# Patient Record
Sex: MALE
[De-identification: names, ages, dates, MRNs, and addresses within clinical notes are randomized per-mention and may not be internally consistent; named-entity substitution may affect disease eponyms.]

---

## 2020-05-10 ENCOUNTER — HOSPITAL ENCOUNTER (OUTPATIENT)
Dept: HOSPITAL 92 - ERS | Age: 65
Setting detail: OBSERVATION
LOS: 2 days | Discharge: HOME | End: 2020-05-12
Attending: INTERNAL MEDICINE | Admitting: INTERNAL MEDICINE
Payer: SELF-PAY

## 2020-05-10 VITALS — BODY MASS INDEX: 27.8 KG/M2

## 2020-05-10 DIAGNOSIS — R42: ICD-10-CM

## 2020-05-10 DIAGNOSIS — R00.1: ICD-10-CM

## 2020-05-10 DIAGNOSIS — R55: Primary | ICD-10-CM

## 2020-05-10 DIAGNOSIS — R61: ICD-10-CM

## 2020-05-10 DIAGNOSIS — R11.2: ICD-10-CM

## 2020-05-10 LAB
ALBUMIN SERPL BCG-MCNC: 4.3 G/DL (ref 3.4–4.8)
ALP SERPL-CCNC: 113 U/L (ref 40–110)
ALT SERPL W P-5'-P-CCNC: 53 U/L (ref 8–55)
ANION GAP SERPL CALC-SCNC: 12 MMOL/L (ref 10–20)
AST SERPL-CCNC: 37 U/L (ref 5–34)
BASOPHILS # BLD AUTO: 0.1 THOU/UL (ref 0–0.2)
BASOPHILS NFR BLD AUTO: 0.5 % (ref 0–1)
BILIRUB SERPL-MCNC: 0.3 MG/DL (ref 0.2–1.2)
BUN SERPL-MCNC: 17 MG/DL (ref 8.4–25.7)
CALCIUM SERPL-MCNC: 9.5 MG/DL (ref 7.8–10.44)
CHLORIDE SERPL-SCNC: 104 MMOL/L (ref 98–107)
CK SERPL-CCNC: 98 U/L (ref 30–200)
CO2 SERPL-SCNC: 29 MMOL/L (ref 23–31)
CREAT CL PREDICTED SERPL C-G-VRATE: 0 ML/MIN (ref 70–130)
EOSINOPHIL # BLD AUTO: 0.1 THOU/UL (ref 0–0.7)
EOSINOPHIL NFR BLD AUTO: 0.8 % (ref 0–10)
GLOBULIN SER CALC-MCNC: 2.5 G/DL (ref 2.4–3.5)
GLUCOSE SERPL-MCNC: 99 MG/DL (ref 80–115)
HGB BLD-MCNC: 14.5 G/DL (ref 14–18)
LIPASE SERPL-CCNC: 26 U/L (ref 8–78)
LYMPHOCYTES # BLD: 1.1 THOU/UL (ref 1.2–3.4)
LYMPHOCYTES NFR BLD AUTO: 9.3 % (ref 21–51)
MCH RBC QN AUTO: 32.5 PG (ref 27–31)
MCV RBC AUTO: 97.2 FL (ref 78–98)
MONOCYTES # BLD AUTO: 0.6 THOU/UL (ref 0.11–0.59)
MONOCYTES NFR BLD AUTO: 5 % (ref 0–10)
NEUTROPHILS # BLD AUTO: 9.6 THOU/UL (ref 1.4–6.5)
NEUTROPHILS NFR BLD AUTO: 84.4 % (ref 42–75)
PLATELET # BLD AUTO: 213 THOU/UL (ref 130–400)
POTASSIUM SERPL-SCNC: 4.5 MMOL/L (ref 3.5–5.1)
RBC # BLD AUTO: 4.47 MILL/UL (ref 4.7–6.1)
SODIUM SERPL-SCNC: 140 MMOL/L (ref 136–145)
TROPONIN I SERPL DL<=0.01 NG/ML-MCNC: (no result) NG/ML (ref ?–0.03)
WBC # BLD AUTO: 11.3 THOU/UL (ref 4.8–10.8)

## 2020-05-10 PROCEDURE — 96361 HYDRATE IV INFUSION ADD-ON: CPT

## 2020-05-10 PROCEDURE — 96375 TX/PRO/DX INJ NEW DRUG ADDON: CPT

## 2020-05-10 PROCEDURE — 96376 TX/PRO/DX INJ SAME DRUG ADON: CPT

## 2020-05-10 PROCEDURE — 96372 THER/PROPH/DIAG INJ SC/IM: CPT

## 2020-05-10 PROCEDURE — 70450 CT HEAD/BRAIN W/O DYE: CPT

## 2020-05-10 PROCEDURE — 84443 ASSAY THYROID STIM HORMONE: CPT

## 2020-05-10 PROCEDURE — G0378 HOSPITAL OBSERVATION PER HR: HCPCS

## 2020-05-10 PROCEDURE — 93306 TTE W/DOPPLER COMPLETE: CPT

## 2020-05-10 PROCEDURE — 93005 ELECTROCARDIOGRAM TRACING: CPT

## 2020-05-10 PROCEDURE — 85025 COMPLETE CBC W/AUTO DIFF WBC: CPT

## 2020-05-10 PROCEDURE — 84484 ASSAY OF TROPONIN QUANT: CPT

## 2020-05-10 PROCEDURE — 83690 ASSAY OF LIPASE: CPT

## 2020-05-10 PROCEDURE — 96374 THER/PROPH/DIAG INJ IV PUSH: CPT

## 2020-05-10 PROCEDURE — 71045 X-RAY EXAM CHEST 1 VIEW: CPT

## 2020-05-10 PROCEDURE — 80053 COMPREHEN METABOLIC PANEL: CPT

## 2020-05-10 PROCEDURE — 82550 ASSAY OF CK (CPK): CPT

## 2020-05-10 PROCEDURE — 81003 URINALYSIS AUTO W/O SCOPE: CPT

## 2020-05-10 PROCEDURE — 36415 COLL VENOUS BLD VENIPUNCTURE: CPT

## 2020-05-10 PROCEDURE — 80061 LIPID PANEL: CPT

## 2020-05-10 RX ADMIN — AZITHROMYCIN SCH MLS: 500 INJECTION, POWDER, LYOPHILIZED, FOR SOLUTION INTRAVENOUS at 23:47

## 2020-05-10 RX ADMIN — CEFTRIAXONE SCH MLS: 2 INJECTION, POWDER, FOR SOLUTION INTRAMUSCULAR; INTRAVENOUS at 22:44

## 2020-05-10 NOTE — CT
CT head noncontrast



HISTORY: Vomiting. Hypertension.



FINDINGS: There is no evidence of acute intracranial hemorrhage or infarct. The ventricles appear nor
mal in size, shape and position.



There is no mass effect or shift of midline structures.



Visualized paranasal sinuses remain well aerated. Small osseous excrescence is noted just anterior to
 the left side of the base of the nasal bone.



  



IMPRESSION :

No acute intracranial abnormalities are demonstrated.



Reported By: FLASH Freire 

Electronically Signed:  5/10/2020 7:12 PM

## 2020-05-10 NOTE — RAD
Chest one view



HISTORY: Syncope.



FINDINGS: Cardiac silhouette is magnified by projection. Pulmonary vasculature is unremarkable.



Subtle ill-defined areas of increased density at each lower lobe may represent mild parenchymal infil
trate.



No lobar consolidation or evidence of pneumothorax.



Gas under the right hemidiaphragm appears to represent the hepatic flexure of the colon.



  



IMPRESSION :

Possible mild patchy bibasilar infiltrate. Clinical correlation regarding other signs and symptoms of
 multifocal pneumonitis is required. No lobar consolidation.



Reported By: FLASH Freire 

Electronically Signed:  5/10/2020 6:57 PM

## 2020-05-10 NOTE — HP
PRIMARY CARE PHYSICIAN:  None.



CHIEF COMPLAINT:  Near syncope, nausea, vomiting, and diaphoresis.



HISTORY OF PRESENT ILLNESS:  This is a very pleasant 64-year-old  male, who

reported to the emergency room today after he was at his son's house, had lunch,

drank a soda, which he had stopped drinking about seven months ago and had

near-syncope episodes, some diaphoresis, nausea, and then threw up once.  He reports

that this happens when he drinks soda.  The last time that he was admitted was in

[facility] where they did a workup.  He says they did an

echocardiogram at that time and he was sent home with no real explanation other than

he needed to stop drinking soda.  He reports that this episode happened again two

weeks ago and again he had a soda at that time.  He was always concerned because

while he was in the emergency room, his heart rate dropped into the 40s and he said

at that point that he also felt the similar symptoms with the nausea and diaphoresis

and the weakness.  His workup in the emergency room with a brain CT that was

unremarkable; chest x-ray, which shows possible mild patchy bibasilar infiltrates.

There is no lobar consolidation.  He denies any fever or chills at home.  Blood work

with a slightly elevated white blood cell count of 11.3, hemoglobin 14.5, hematocrit

43.4, and platelet count 213.  Chemistry unremarkable except for an AST, which

slightly bumped to 37 and an alkaline phosphatase slightly bumped to 113.  Troponins

x2 were undetectable.  Urine is negative other than it was a little bit turbid with

no leukocyte esterase or white blood cells.  The patient to be admitted for possible

symptomatic bradycardia workup and further management. 



REVIEW OF SYSTEMS:  Reports near syncope.  Reports diaphoresis.  Reports nausea.

Reports vomiting.  Reports dizziness.  Reports weakness, which is transient.  Denies

fever, chills, cough, or shortness of breath.  All systems are reviewed and are

negative unless mentioned in the HPI or above. 



PAST MEDICAL HISTORY:  None other than the similar episodes over the last seven

months when he drinks soda. 



PAST SURGICAL HISTORY:  Right toe surgery.



PSYCHIATRIC HISTORY:  None.



SOCIAL HISTORY:  Drinks socially.  Denies drug use.  No smoking history.



KNOWN ALLERGIES:  None.



CURRENT MEDICATIONS:  None.



PHYSICAL EXAMINATION:

VITAL SIGNS:  Blood pressure 157/98, pulse 61, respiratory rate is 16, pO2 sats are

98% on room air. 

CONSTITUTIONAL:  The patient appears nontoxic.  He is alert and oriented to person,

place, and time. 

HEAD:  Atraumatic and normocephalic. 

EYES:  Pupils are equal, round, and reactive to light.  Extraocular muscles are

intact. 

ENT:  Mouth exam is normal.  Mucous membranes are moist. 

NECK:  Normal range of motion.  Trachea is midline. 

RESPIRATORY/CHEST:  Breath sounds are clear.  Chest expansion is equal. 

CARDIOVASCULAR:  Regular sinus rhythm.  Heart sounds are normal. 

ABDOMEN:  Nontender.  Bowel sounds are heard. 

BACK:  Normal range of motion.  No tenderness. 

EXTREMITIES:  Upper extremity, normal range of motion.  Motor strength is normal.

Radial pulses are normal.  Lower extremity, normal range of motion.  Motor strength

is normal.  Pedal pulses are normal.  No edema is noted. 

NEUROLOGIC:  The patient is oriented to person, place, and time.  There are no focal

deficits noted. 

SKIN:  Warm and dry.  Normal in color of the skin that is visualized.



DIAGNOSTIC DATA:  EKG in the emergency room shows sinus keyla, rate 57.  No ST or

T-wave changes. 



PLAN/ASSESSMENT:  

1. Possible symptomatic bradycardia.  Not sure about the correlations between

drinking a soda and these symptoms, although I did tell him that he probably should

stop drinking sodas.  We will get the records from [facility].  I have ordered an echocardiogram and a Cardiology consult.  We will repeat

labs, add lipids, TSH. 

2. He had some nausea and vomiting when he first appeared in the ER, but he states

that has resolved.  We will continue to monitor that. 

3. Deep venous thrombosis and gastrointestinal prophylaxis started.

4. Possible pneumonitis seen on the x-ray.  He denies any symptoms.  No cough.  No

shortness of breath.  No fever.  No chills.  The case is discussed with Dr. Lebron,

who suggested that we start antibiotics while he is hospitalized and while working

him up, probably could be deescalated if he continues to not have any symptoms. 

5. The case was discussed with Dr. Lebron and he agrees with the plan.

6. Hospitalization dependent on clinical findings.







Job ID:  [id number]

## 2020-05-11 LAB
ALBUMIN SERPL BCG-MCNC: 4 G/DL (ref 3.4–4.8)
ALP SERPL-CCNC: 109 U/L (ref 40–110)
ALT SERPL W P-5'-P-CCNC: 42 U/L (ref 8–55)
ANION GAP SERPL CALC-SCNC: 11 MMOL/L (ref 10–20)
AST SERPL-CCNC: 23 U/L (ref 5–34)
BASOPHILS # BLD AUTO: 0 THOU/UL (ref 0–0.2)
BASOPHILS NFR BLD AUTO: 0.4 % (ref 0–1)
BILIRUB SERPL-MCNC: 0.4 MG/DL (ref 0.2–1.2)
BUN SERPL-MCNC: 13 MG/DL (ref 8.4–25.7)
CALCIUM SERPL-MCNC: 9 MG/DL (ref 7.8–10.44)
CHD RISK SERPL-RTO: 4.3 (ref ?–4.5)
CHLORIDE SERPL-SCNC: 106 MMOL/L (ref 98–107)
CHOLEST SERPL-MCNC: 226 MG/DL
CO2 SERPL-SCNC: 27 MMOL/L (ref 23–31)
CREAT CL PREDICTED SERPL C-G-VRATE: 136 ML/MIN (ref 70–130)
EOSINOPHIL # BLD AUTO: 0.1 THOU/UL (ref 0–0.7)
EOSINOPHIL NFR BLD AUTO: 1.8 % (ref 0–10)
GLOBULIN SER CALC-MCNC: 2.6 G/DL (ref 2.4–3.5)
GLUCOSE SERPL-MCNC: 106 MG/DL (ref 80–115)
HDLC SERPL-MCNC: 53 MG/DL
HGB BLD-MCNC: 14.2 G/DL (ref 14–18)
LDLC SERPL CALC-MCNC: 153 MG/DL
LYMPHOCYTES # BLD: 1.4 THOU/UL (ref 1.2–3.4)
LYMPHOCYTES NFR BLD AUTO: 20.2 % (ref 21–51)
MCH RBC QN AUTO: 31.5 PG (ref 27–31)
MCV RBC AUTO: 98 FL (ref 78–98)
MONOCYTES # BLD AUTO: 0.6 THOU/UL (ref 0.11–0.59)
MONOCYTES NFR BLD AUTO: 8 % (ref 0–10)
NEUTROPHILS # BLD AUTO: 4.9 THOU/UL (ref 1.4–6.5)
NEUTROPHILS NFR BLD AUTO: 69.6 % (ref 42–75)
PLATELET # BLD AUTO: 217 THOU/UL (ref 130–400)
POTASSIUM SERPL-SCNC: 3.9 MMOL/L (ref 3.5–5.1)
RBC # BLD AUTO: 4.52 MILL/UL (ref 4.7–6.1)
SODIUM SERPL-SCNC: 140 MMOL/L (ref 136–145)
TRIGL SERPL-MCNC: 99 MG/DL (ref ?–150)
TROPONIN I SERPL DL<=0.01 NG/ML-MCNC: (no result) NG/ML (ref ?–0.03)
WBC # BLD AUTO: 7.1 THOU/UL (ref 4.8–10.8)

## 2020-05-11 RX ADMIN — AZITHROMYCIN SCH MLS: 500 INJECTION, POWDER, LYOPHILIZED, FOR SOLUTION INTRAVENOUS at 22:28

## 2020-05-11 RX ADMIN — CEFTRIAXONE SCH MLS: 2 INJECTION, POWDER, FOR SOLUTION INTRAMUSCULAR; INTRAVENOUS at 21:41

## 2020-05-11 NOTE — PDOC.HOSPP
- Subjective


Encounter Date: 05/11/20


Encounter Time: 08:15


Subjective: 





Patient seen and examined. No new complaints. No overnight events





- Objective


Vital Signs & Weight: 


 Vital Signs (12 hours)











  Temp Pulse Pulse Pulse Resp BP BP


 


 05/11/20 08:43    67  67   119/76  137/69


 


 05/11/20 07:52  98.6 F  61    18  


 


 05/11/20 03:23  97.4 F L  62    16  














  BP Pulse Ox


 


 05/11/20 08:43  


 


 05/11/20 07:52  120/73  97


 


 05/11/20 03:23  118/73  99








 Weight











Weight                         193 lb 12.8 oz














Result Diagrams: 


 05/11/20 04:55





 05/11/20 04:55


Radiology Reviewed by me: Yes


EKG Reviewed by me: Yes





Hospitalist ROS





- Review of Systems


ENT: denies: ear pain, ear discharge, nose pain, nose discharge, nose congestion

, mouth pain, mouth swelling, throat pain, throat swelling, other


Respiratory: denies: cough, dry, shortness of breath, hemoptysis, SOB with 

excertion, pleuritic pain, sputum, wheezing, other


Cardiovascular: denies: chest pain, palpitations, orthopnea, paroxysmal noc. 

dyspnea, edema, light headedness, other


Gastrointestinal: denies: nausea, vomiting, abdominal pain, diarrhea, 

constipation, melena, hematochezia, other


Genitourinary: denies: dysuria, frequency, incontinence, hematuria, retention, 

other


Musculoskeletal: denies: neck pain, shoulder pain, arm pain, back pain, hand 

pain, leg pain, foot pain, other





- Medication


Medications: 


Active Medications











Generic Name Dose Route Start Last Admin





  Trade Name Linette  PRN Reason Stop Dose Admin


 


Acetaminophen  650 mg  05/10/20 21:04  05/10/20 22:12





  Tylenol  PO   650 mg





  Q4H PRN   Administration





  Headache/Fever/Mild Pain (1-3)   





     





     





     


 


Enoxaparin Sodium  40 mg  05/11/20 09:00  05/11/20 09:04





  Lovenox  SC   40 mg





  [facility]   Administration





     





     





     





     


 


Famotidine  20 mg  05/11/20 09:00  05/11/20 09:04





  Pepcid  PO   20 mg





  BID [facility]   Administration





     





     





     





     


 


Azithromycin 500 mg/ Sodium  250 mls @ 250 mls/hr  05/10/20 23:00  05/10/20 23:

47





  Chloride  IVPB   250 mls





  2300 [facility]   Administration





     





     





     





     


 


Ceftriaxone Sodium 2 gm/  100 mls @ 200 mls/hr  05/10/20 22:00  05/10/20 22:44





  Sodium Chloride  IVPB   100 mls





  2200 [facility]   Administration





     





     





     





     














- Exam


General Appearance: NAD, awake alert


Eye: PERRL, anicteric sclera


ENT: normocephalic atraumatic, no oropharyngeal lesions


Neck: supple, symmetric, no JVD, no thyromegaly


Heart: RRR, no murmur, no gallops, no rubs


Respiratory: CTAB, no wheezes, no rales, no ronchi


Gastrointestinal: soft, non-tender, non-distended, normal bowel sounds


Extremities: no cyanosis, no clubbing


Skin: normal turgor, no lesions


Neurological: cranial nerve grossly intact, no focal deficits


Musculoskeletal: normal tone, normal strength


Psychiatric: normal affect, normal behavior





Hosp A/P


(1) Syncope


Code(s): R55 - SYNCOPE AND COLLAPSE   Status: Acute   





(2) Bradycardia


Code(s): R00.1 - BRADYCARDIA, UNSPECIFIED   Status: Acute   





(3) Pneumonia


Code(s): J18.9 - PNEUMONIA, UNSPECIFIED ORGANISM   Status: Acute   





- Plan


old records reviewed/req, continue antibiotics





echo today


cardiology consulted


orthostatic vitals


continue rocephin and azithromycin


expecting discharge tomorrow 


continue tele monitoring


medication reviewed and continue symptomatic treatment

## 2020-05-12 VITALS — DIASTOLIC BLOOD PRESSURE: 72 MMHG | TEMPERATURE: 98.3 F | SYSTOLIC BLOOD PRESSURE: 122 MMHG

## 2020-05-12 NOTE — PDOC.HOSPP
- Subjective


Encounter Date: 05/12/20


Encounter Time: 08:10


Subjective: 





Patient seen and examined. No new complaints. No overnight events





- Objective


Vital Signs & Weight: 


 Vital Signs (12 hours)











  Temp Pulse Resp BP BP Pulse Ox


 


 05/12/20 07:16  97.8 F  62  12   116/66  96


 


 05/12/20 03:34  97.6 F  55 L   105/56 L   97








 Weight











Admit Weight                   193 lb 12.8 oz


 


Weight                         193 lb 12.8 oz














I&O: 


 











 05/11/20 05/12/20 05/13/20





 06:59 06:59 06:59


 


Intake Total  950 


 


Balance  950 











Result Diagrams: 


 05/11/20 04:55





 05/11/20 04:55


Radiology Reviewed by me: Yes


EKG Reviewed by me: Yes





Hospitalist ROS





- Review of Systems


ENT: denies: ear pain, ear discharge, nose pain, nose discharge, nose congestion

, mouth pain, mouth swelling, throat pain, throat swelling, other


Respiratory: denies: cough, dry, shortness of breath, hemoptysis, SOB with 

excertion, pleuritic pain, sputum, wheezing, other


Cardiovascular: denies: chest pain, palpitations, orthopnea, paroxysmal noc. 

dyspnea, edema, light headedness, other


Gastrointestinal: denies: nausea, vomiting, abdominal pain, diarrhea, 

constipation, melena, hematochezia, other


Genitourinary: denies: dysuria, frequency, incontinence, hematuria, retention, 

other


Musculoskeletal: denies: neck pain, shoulder pain, arm pain, back pain, hand 

pain, leg pain, foot pain, other





- Medication


Medications: 


Active Medications











Generic Name Dose Route Start Last Admin





  Trade Name Linette  PRN Reason Stop Dose Admin


 


Acetaminophen  650 mg  05/10/20 21:04  05/10/20 22:12





  Tylenol  PO   650 mg





  Q4H PRN   Administration





  Headache/Fever/Mild Pain (1-3)   





     





     





     


 


Enoxaparin Sodium  40 mg  05/11/20 09:00  05/12/20 09:03





  Lovenox  SC   40 mg





  0900 [facility]   Administration





     





     





     





     


 


Famotidine  20 mg  05/11/20 09:00  05/12/20 09:03





  Pepcid  PO   20 mg





  BID LIBERT[facility]   Administration





     





     





     





     


 


Azithromycin 500 mg/ Sodium  250 mls @ 250 mls/hr  05/10/20 23:00  05/11/20 22:

28





  Chloride  IVPB   250 mls





  2300 [facility]   Administration





     





     





     





     


 


Ceftriaxone Sodium 2 gm/  100 mls @ 200 mls/hr  05/10/20 22:00  05/11/20 21:41





  Sodium Chloride  IVPB   100 mls





  2200 [facility]   Administration





     





     





     





     














- Exam


General Appearance: NAD, awake alert


Eye: PERRL, anicteric sclera


ENT: normocephalic atraumatic, no oropharyngeal lesions


Neck: supple, symmetric, no JVD


Heart: RRR, no murmur, no gallops, no rubs


Respiratory: CTAB, no wheezes, no rales, no ronchi


Gastrointestinal: soft, non-tender, non-distended, normal bowel sounds


Extremities: no cyanosis, no clubbing, no edema


Skin: normal turgor, no lesions


Neurological: cranial nerve grossly intact, no focal deficits


Musculoskeletal: normal tone, normal strength


Psychiatric: normal affect, normal behavior





Hosp A/P


(1) Syncope


Code(s): R55 - SYNCOPE AND COLLAPSE   Status: Acute   





(2) Bradycardia


Code(s): R00.1 - BRADYCARDIA, UNSPECIFIED   Status: Acute   





(3) Pneumonia


Code(s): J18.9 - PNEUMONIA, UNSPECIFIED ORGANISM   Status: Acute   





- Plan


old records reviewed/req





echo today


cardiology consulted


orthostatic vitals


continue rocephin and azithromycin


expecting discharge tomorrow 


continue tele monitoring


medication reviewed and continue symptomatic treatment





05/12/20





[facility] 


if OK, and cardio OK, will DC later today


? plan for event recorder

## 2020-05-12 NOTE — PRG
DATE OF SERVICE:  05/12/2020



SUBJECTIVE:  Mr. Dubon is doing well.  No current complaints.  His rhythm

appears stable.  Review of the previous studies for [facility]

suggested normal LVEF and no significant stenosis of his carotids. 



OBJECTIVE:  VITAL SIGNS:  Blood pressure 122/72, pulse 85, and temperature 98.3. 

LUNGS:  Clear to auscultation. 

HEART:  Regular rate and rhythm. 

ABDOMEN:  Soft, nontender, and nondistended. 

EXTREMITIES:  No edema.



IMPRESSION:  

1. Dizziness.

2. Lightheadedness.

3. Presyncope.



RECOMMENDATIONS:  Mr. Dubon's cardiac workup in the past is negative.

Recommend a 3-week event recorder.  Can arrange as an outpatient.  Otherwise, I have

no further recommendations. 







Job ID:  [id number]

## 2020-05-12 NOTE — DIS
DATE OF ADMISSION:  05/10/2020



DATE OF DISCHARGE:  05/12/2020



PRIMARY CARE PHYSICIAN:  [facility].



DISCHARGE DISPOSITION:  Home.



PRIMARY DISCHARGE DIAGNOSES:  

1. Suspected pneumonia.

2. Symptomatic bradycardia, resolved.

3. Near syncope, resolved.



SECONDARY DISCHARGE DIAGNOSIS:  None.



PRIMARY PROCEDURES/OPERATIONS:  None.



RADIOLOGICAL INVESTIGATION:  CT of brain, negative.  Chest x-ray, normal.



SIGNIFICANT LABORATORY DATA:  WBC 7.1, hemoglobin 14.2, platelet 217.  Sodium 140,

creatinine 0.69.  LFTs normal.  Cardiac enzymes negative x3.  Urinalysis normal. 



DISCHARGE MEDICATIONS:  Omnicef 300 mg p.o. b.i.d. for 5 days.



CONTRAINDICATION:  None.



CODE STATUS:  Full code.



INPATIENT CONSULTANT:  Dr. Angel was following while in hospital.



TEST RESULTS PENDING ON DISCHARGE:  None.



ALLERGIES:  NO KNOWN DRUG ALLERGIES.



DISCHARGE PLAN:  Post hospital, the patient will follow up with primary care

physician in 1 week. 



HOSPITAL COURSE:  A 64-year-old male, who was admitted by nurse practitioner.

Please see her H and P for further details.  The patient was experiencing dizzy

spells.  The patient had bradycardia on arrival.  The patient's CT brain was

negative.  Routine blood test was unremarkable.  Chest x-ray was suspected for some

infiltration and that is why he was empirically treated for community-acquired

pneumonia with Rocephin and azithromycin.  This patient had full workup done

recently at [facility].  We obtained medical records from

that hospital.  Cardiology recommending Holter monitoring.  There is no plan for

further testing during this admission.  The patient's cardiac etiology has been

ruled out.  His heart rate is remaining in 60s to 70s and he has no further

bradycardia episode while in the hospital.  He does not have any orthostatic

hypotension.  At this point, telemetry remained unremarkable and Cardiology cleared

him for discharge later on today. 



The patient is seen and examined at bedside today.  The patient will be discharged

to home later on today. 







Job ID:  [id number]

## 2020-05-16 NOTE — EKG
Test Reason : SYNCOPE

Blood Pressure : ***/*** mmHG

Vent. Rate : 057 BPM     Atrial Rate : 057 BPM

   P-R Int : 158 ms          QRS Dur : 092 ms

    QT Int : 420 ms       P-R-T Axes : 021 -10 004 degrees

   QTc Int : 408 ms

 

Sinus bradycardia

Otherwise normal ECG

 

Confirmed by ANDREA CHACON DO ([id number]),  ABBE GRULLON ([id number]) on 5/16/2020 1:30:26 PM

 

Referred By:             Confirmed By:ANDREA CHACON DO

## 2020-05-18 ENCOUNTER — HOSPITAL ENCOUNTER (OUTPATIENT)
Dept: HOSPITAL 92 - ERS | Age: 65
Setting detail: OBSERVATION
LOS: 2 days | Discharge: HOME | End: 2020-05-20
Attending: INTERNAL MEDICINE | Admitting: HOSPITALIST
Payer: MEDICARE

## 2020-05-18 VITALS — BODY MASS INDEX: 26.8 KG/M2

## 2020-05-18 DIAGNOSIS — Z89.421: ICD-10-CM

## 2020-05-18 DIAGNOSIS — R42: ICD-10-CM

## 2020-05-18 DIAGNOSIS — R55: ICD-10-CM

## 2020-05-18 DIAGNOSIS — R07.89: Primary | ICD-10-CM

## 2020-05-18 DIAGNOSIS — R00.1: ICD-10-CM

## 2020-05-18 DIAGNOSIS — I10: ICD-10-CM

## 2020-05-18 LAB
ALBUMIN SERPL BCG-MCNC: 4.4 G/DL (ref 3.4–4.8)
ALP SERPL-CCNC: 114 U/L (ref 40–110)
ALT SERPL W P-5'-P-CCNC: 33 U/L (ref 8–55)
ANION GAP SERPL CALC-SCNC: 14 MMOL/L (ref 10–20)
AST SERPL-CCNC: 19 U/L (ref 5–34)
BASOPHILS # BLD AUTO: 0 THOU/UL (ref 0–0.2)
BASOPHILS NFR BLD AUTO: 0.2 % (ref 0–1)
BILIRUB SERPL-MCNC: 0.5 MG/DL (ref 0.2–1.2)
BUN SERPL-MCNC: 13 MG/DL (ref 8.4–25.7)
CALCIUM SERPL-MCNC: 9.1 MG/DL (ref 7.8–10.44)
CHLORIDE SERPL-SCNC: 103 MMOL/L (ref 98–107)
CO2 SERPL-SCNC: 25 MMOL/L (ref 23–31)
CREAT CL PREDICTED SERPL C-G-VRATE: 0 ML/MIN (ref 70–130)
EOSINOPHIL # BLD AUTO: 0.1 THOU/UL (ref 0–0.7)
EOSINOPHIL NFR BLD AUTO: 0.8 % (ref 0–10)
GLOBULIN SER CALC-MCNC: 2.6 G/DL (ref 2.4–3.5)
GLUCOSE SERPL-MCNC: 130 MG/DL (ref 80–115)
HGB BLD-MCNC: 14.5 G/DL (ref 14–18)
LYMPHOCYTES # BLD: 1.1 THOU/UL (ref 1.2–3.4)
LYMPHOCYTES NFR BLD AUTO: 13.2 % (ref 21–51)
MCH RBC QN AUTO: 31.4 PG (ref 27–31)
MCV RBC AUTO: 97.3 FL (ref 78–98)
MONOCYTES # BLD AUTO: 0.3 THOU/UL (ref 0.11–0.59)
MONOCYTES NFR BLD AUTO: 3.8 % (ref 0–10)
NEUTROPHILS # BLD AUTO: 7.1 THOU/UL (ref 1.4–6.5)
NEUTROPHILS NFR BLD AUTO: 81.9 % (ref 42–75)
PLATELET # BLD AUTO: 231 THOU/UL (ref 130–400)
POTASSIUM SERPL-SCNC: 4.1 MMOL/L (ref 3.5–5.1)
RBC # BLD AUTO: 4.61 MILL/UL (ref 4.7–6.1)
SODIUM SERPL-SCNC: 138 MMOL/L (ref 136–145)
TROPONIN I SERPL DL<=0.01 NG/ML-MCNC: (no result) NG/ML (ref ?–0.03)
WBC # BLD AUTO: 8.6 THOU/UL (ref 4.8–10.8)

## 2020-05-18 PROCEDURE — 36415 COLL VENOUS BLD VENIPUNCTURE: CPT

## 2020-05-18 PROCEDURE — 83735 ASSAY OF MAGNESIUM: CPT

## 2020-05-18 PROCEDURE — 80053 COMPREHEN METABOLIC PANEL: CPT

## 2020-05-18 PROCEDURE — 85379 FIBRIN DEGRADATION QUANT: CPT

## 2020-05-18 PROCEDURE — 93017 CV STRESS TEST TRACING ONLY: CPT

## 2020-05-18 PROCEDURE — 93005 ELECTROCARDIOGRAM TRACING: CPT

## 2020-05-18 PROCEDURE — 78452 HT MUSCLE IMAGE SPECT MULT: CPT

## 2020-05-18 PROCEDURE — 96372 THER/PROPH/DIAG INJ SC/IM: CPT

## 2020-05-18 PROCEDURE — 97139 UNLISTED THERAPEUTIC PX: CPT

## 2020-05-18 PROCEDURE — 96361 HYDRATE IV INFUSION ADD-ON: CPT

## 2020-05-18 PROCEDURE — A9500 TC99M SESTAMIBI: HCPCS

## 2020-05-18 PROCEDURE — 85025 COMPLETE CBC W/AUTO DIFF WBC: CPT

## 2020-05-18 PROCEDURE — 83880 ASSAY OF NATRIURETIC PEPTIDE: CPT

## 2020-05-18 PROCEDURE — 84443 ASSAY THYROID STIM HORMONE: CPT

## 2020-05-18 PROCEDURE — 84484 ASSAY OF TROPONIN QUANT: CPT

## 2020-05-18 PROCEDURE — 71045 X-RAY EXAM CHEST 1 VIEW: CPT

## 2020-05-18 PROCEDURE — 99285 EMERGENCY DEPT VISIT HI MDM: CPT

## 2020-05-18 PROCEDURE — G0378 HOSPITAL OBSERVATION PER HR: HCPCS

## 2020-05-18 PROCEDURE — 94760 N-INVAS EAR/PLS OXIMETRY 1: CPT

## 2020-05-18 PROCEDURE — 83036 HEMOGLOBIN GLYCOSYLATED A1C: CPT

## 2020-05-18 PROCEDURE — 80048 BASIC METABOLIC PNL TOTAL CA: CPT

## 2020-05-18 PROCEDURE — 96360 HYDRATION IV INFUSION INIT: CPT

## 2020-05-18 NOTE — RAD
PORTABLE CHEST:

 

History: Chest pain. 

 

FINDINGS: 

Lung fields appear clear of infiltrate. Heart and mediastinum unremarkable. Vasculature normal. 

 

IMPRESSION: 

No acute process identified. 

 

POS: [facility]

## 2020-05-18 NOTE — PDOC.HHP
Hospitalist HPI





- History of Present Illness


Chest Pain


History of Present Illness: 





PCP: NONE





Cardiologist: Dr. Angel





The patient is [place] speaking, so H&P was from .





The patient is a 64/M with PMH significant for HTN that presents to the ER for 

the above complaint. The patient reports developing chest pain for past 5 days, 

located left chest, radiating to left arm and neck, describes as a dull pressure

, constant, exacerbated and relieved by nothing. He denies any heart 

palpitations, sob, wheezing. Denies any headaches or light headedness. Denies 

any abdominal pain, n/v/d. Denies any fever or chills.





He was discharged from our hospital on 5/12/20 for suspected pneumonia (d/c'd 

on omnicef), bradycardia and near syncope. Dr. Angel was consulted and 

noted that patient recently had full cardiac workup at the [facility] and if workup was unremarkable, he recommended follow up in 3 weeks 

for possible event recorder. 


ED Course: 





EKG SB 60s


Trop negative


CXR negative


CMP and CBC unremarkable








Given:


ASA 324mg





Hospitalist ROS





- Review of Systems


Constitutional: denies: fever, chills, sweats, weakness, malaise, other


Eyes: denies: pain, vision change, conjunctivae inflammation, eyelid 

inflammation, redness, other


ENT: denies: ear pain, ear discharge, nose pain, nose discharge, nose congestion

, mouth pain, mouth swelling, throat pain, throat swelling, other


Respiratory: denies: cough, dry, shortness of breath, hemoptysis, SOB with 

excertion, pleuritic pain, sputum, wheezing, other


Cardiovascular: reports: chest pain.  denies: palpitations, orthopnea, 

paroxysmal noc. dyspnea, edema, light headedness


Gastrointestinal: denies: nausea, vomiting, abdominal pain, diarrhea, 

constipation, melena, hematochezia, other


Genitourinary: denies: dysuria, frequency, incontinence, hematuria, retention, 

other


Neurological: denies: weakness, numbness, incoordination, change in speech, 

confusion, seizures, other





Hospitalist History





- Past Medical History


Cardiac: reports: HTN (not taking any medications)





- Past Surgical History


Past Surgical History: reports: Other (right 2nd toe amputation from working 

accident)





- Family History


Family History: reports: respiratory disorder





- Social History


Smoking Status: Never smoker


Alcohol: reports: None


Drugs: reports: none


Living Situation: With Family


Occupation: Lives in [place], disability





- Exam


General Appearance: NAD, awake alert


Eye: anicteric sclera


ENT: normocephalic atraumatic


Neck: supple, no JVD


Heart: RRR, no murmur, no gallops, no rubs, normal peripheral pulses


Respiratory: CTAB, no wheezes, no rales, no ronchi, no tachypnea


Gastrointestinal: soft, non-tender, normal bowel sounds, no guarding, no 

rigidity


Extremities: no cyanosis, no edema


Neurological: no focal deficits


Musculoskeletal: normal tone, normal strength


Psychiatric: normal affect, A&O x 3





Hospitalist Results





- Labs


Result Diagrams: 


 05/18/20 14:09





 05/18/20 14:09


Lab results: 


 











WBC  8.6 thou/uL (4.8-10.8)   05/18/20  14:09    


 


Hgb  14.5 g/dL (14.0-18.0)   05/18/20  14:09    


 


Hct  44.9 % (42.0-52.0)   05/18/20  14:09    


 


MCV  97.3 fL (78.0-98.0)   05/18/20  14:09    


 


Plt Count  231 thou/uL (130-400)   05/18/20  14:09    


 


Neutrophils %  81.9 % (42.0-75.0)  H  05/18/20  14:09    


 


Sodium  138 mmol/L (136-145)   05/18/20  14:09    


 


Potassium  4.1 mmol/L (3.5-5.1)   05/18/20  14:09    


 


Chloride  103 mmol/L ()   05/18/20  14:09    


 


Carbon Dioxide  25 mmol/L (23-31)   05/18/20  14:09    


 


BUN  13 mg/dL (8.4-25.7)   05/18/20  14:09    


 


Creatinine  0.78 mg/dL (0.7-1.3)   05/18/20  14:09    


 


Glucose  130 mg/dL ()  H  05/18/20  14:09    


 


Calcium  9.1 mg/dL (7.8-10.44)   05/18/20  14:09    


 


Total Bilirubin  0.5 mg/dL (0.2-1.2)   05/18/20  14:09    


 


AST  19 U/L (5-34)   05/18/20  14:09    


 


ALT  33 U/L (8-55)   05/18/20  14:09    


 


Alkaline Phosphatase  114 U/L ()  H  05/18/20  14:09    


 


Troponin I  Less than  0.010 ng/mL (< 0.028)   05/18/20  14:09    


 


Serum Total Protein  7.0 g/dL (5.8-8.1)   05/18/20  14:09    


 


Albumin  4.4 g/dL (3.4-4.8)   05/18/20  14:09    














- EKG Interpretation


EKG: 





SB





- Radiology Interpretation


  ** Chest x-ray


Status: report reviewed by me





Hospitalist H&P A/P





- Problem


(1) Chest pain


Code(s): R07.9 - CHEST PAIN, UNSPECIFIED   Status: Acute   


Assessment and Plan: 


Admit to telemetry floor, observation status


Expected length of stay less than 2 midnights


HEART Score 2, low score


EKG SB, Trop negative, CXR unremarkable


Patient had recent cardiac workup at [facility]


Will obtain records for cardiac workup at [facility]


Will trend trops, check BNP and Mag and TSH level


FLP on 5/11/20 completed


   Cholesterol 226


   Triglycerides 99


   


   HDL 53


BS elevated at 130, will check HA1C


Continue ASA








(2) Bradycardia


Code(s): R00.1 - BRADYCARDIA, UNSPECIFIED   Status: Chronic   


Assessment and Plan: 


Stable, 60s


Previously seen by Dr. Angel in May 2020, recommended 3 week follow up for 

possible event recorder








(3) HTN (hypertension)


Code(s): I10 - ESSENTIAL (PRIMARY) HYPERTENSION   Status: Chronic   


Assessment and Plan: 


No medication regimen


BP stable 122/67


Will continue to monitor








- Plan


Plan: 





Consult walking program


Pepcid GI prophylaxis


LMWH and SCDs for DVT prophylaxis


Full Code


Medical decision maker is Tesha Sol at [phone]


Discussed case with Dr. Lebron

## 2020-05-19 LAB
ANION GAP SERPL CALC-SCNC: 11 MMOL/L (ref 10–20)
BASOPHILS # BLD AUTO: 0.1 THOU/UL (ref 0–0.2)
BASOPHILS NFR BLD AUTO: 1.2 % (ref 0–1)
BUN SERPL-MCNC: 14 MG/DL (ref 8.4–25.7)
CALCIUM SERPL-MCNC: 8.5 MG/DL (ref 7.8–10.44)
CHLORIDE SERPL-SCNC: 107 MMOL/L (ref 98–107)
CO2 SERPL-SCNC: 23 MMOL/L (ref 23–31)
CREAT CL PREDICTED SERPL C-G-VRATE: 131 ML/MIN (ref 70–130)
EOSINOPHIL # BLD AUTO: 0.1 THOU/UL (ref 0–0.7)
EOSINOPHIL NFR BLD AUTO: 2.6 % (ref 0–10)
GLUCOSE SERPL-MCNC: 97 MG/DL (ref 80–115)
HGB BLD-MCNC: 14.3 G/DL (ref 14–18)
LYMPHOCYTES # BLD: 1.6 THOU/UL (ref 1.2–3.4)
LYMPHOCYTES NFR BLD AUTO: 27.4 % (ref 21–51)
MCH RBC QN AUTO: 31.8 PG (ref 27–31)
MCV RBC AUTO: 98.7 FL (ref 78–98)
MONOCYTES # BLD AUTO: 0.5 THOU/UL (ref 0.11–0.59)
MONOCYTES NFR BLD AUTO: 8 % (ref 0–10)
NEUTROPHILS # BLD AUTO: 3.5 THOU/UL (ref 1.4–6.5)
NEUTROPHILS NFR BLD AUTO: 60.8 % (ref 42–75)
PLATELET # BLD AUTO: 215 THOU/UL (ref 130–400)
POTASSIUM SERPL-SCNC: 4.3 MMOL/L (ref 3.5–5.1)
RBC # BLD AUTO: 4.49 MILL/UL (ref 4.7–6.1)
SODIUM SERPL-SCNC: 137 MMOL/L (ref 136–145)
TROPONIN I SERPL DL<=0.01 NG/ML-MCNC: 0.01 NG/ML (ref ?–0.03)
WBC # BLD AUTO: 5.7 THOU/UL (ref 4.8–10.8)

## 2020-05-19 NOTE — PDOC.HOSPP
- Subjective


Subjective: 





Seen and examined. Patient does endorse chest pain this a.m. 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 

radiation to the armor job. States he does have some radiation to the back. No 

shortness of breath. Breathing well on room air.





- Objective


Vital Signs & Weight: 


 Vital Signs (12 hours)











  Temp Pulse Resp BP Pulse Ox


 


 05/19/20 12:00  98.0 F  60  16  126/82  98


 


 05/19/20 07:40  98.3 F  57 L  14  127/76  97


 


 05/19/20 04:04      97


 


 05/19/20 03:46  97.7 F  60  12  121/80  98








 Weight











Weight                         197 lb 8.547 oz














I&O: 


 











 05/18/20 05/19/20 05/20/20





 06:59 06:59 06:59


 


Intake Total  550 980


 


Balance  550 980











Result Diagrams: 


 05/19/20 05:20





 05/19/20 05:20


Radiology Reviewed by me: Yes





Hospitalist ROS





- Review of Systems


All other systems reviewed; all pertinent +/- noted in HPI/Subj





- Medication


Medications: 


Active Medications











Generic Name Dose Route Start Last Admin





  Trade Name Freq  PRN Reason Stop Dose Admin


 


Aspirin  325 mg  05/19/20 09:00  05/19/20 08:35





  Ecotrin  PO   325 mg





  DAILY LIBERTAD   Administration





     





     





     





     


 


Enoxaparin Sodium  40 mg  05/19/20 09:00  05/19/20 08:35





  Lovenox  SC   40 mg





  0900 LIBERTAD   Administration





     





     





     





     


 


Famotidine  20 mg  05/18/20 21:00  05/19/20 08:35





  Pepcid  PO   20 mg





  BID LIBERTAD   Administration





     





     





     





     


 


Sodium Chloride  1,000 mls @ 75 mls/hr  05/18/20 23:55  05/19/20 13:46





  Normal Saline 0.9%  IV   1,000 mls





  .[id number] LIBERTAD   Administration





     





     





     





     














- Exam


General Appearance: NAD, awake alert


Eye: anicteric sclera


ENT: normocephalic atraumatic, moist mucosa


Neck: supple, symmetric


Heart: RRR, no murmur, no gallops, no rubs


Respiratory: CTAB, no wheezes, no rales, no ronchi, normal chest expansion, no 

tachypnea


Gastrointestinal: soft, non-tender, no guarding, no rigidity


Extremities: no edema


Skin: no lesions, no rashes


Neurological: cranial nerve grossly intact, no focal deficits


Musculoskeletal: normal strength


Psychiatric: normal affect, A&O x 3





Hosp A/P


(1) Chest pain


Code(s): R07.9 - CHEST PAIN, UNSPECIFIED   Status: Acute   





(2) Bradycardia


Code(s): R00.1 - BRADYCARDIA, UNSPECIFIED   Status: Chronic   





(3) HTN (hypertension)


Code(s): I10 - ESSENTIAL (PRIMARY) HYPERTENSION   Status: Chronic   





- Plan





Plan:


medical unit with telemetry


cardiology consultation, recommendations appreciated


continuous telemetry to monitor for arrhythmia


has had recent negative cardiac workup at an outside hospital


morphing, oxygen, nitrates, aspirin


continue home medications as able


blood pressure control


blood sugar control


G.I. prophylaxis


DVT prophylaxis

## 2020-05-19 NOTE — CON
DATE OF CONSULTATION:  05/19/2020



INDICATION FOR CONSULTATION:  A 64-year-old patient who complains of chest pain for

several days or weeks.  He speaks very little English, but has been complaining of

chest discomfort.  He also was recently seen in the hospital and underwent a

cardiology consultation by Dr. Angel only 8 days ago due to dizziness and

lightheadedness.  At this time, he complains of chest pain.  He denies any dizziness

to me at this time.  He apparently had some type of cardiac workup over at [facility] in the past.  It appeared that this was mainly due to his

dizziness.  I think he had carotid ultrasound performed.  I do not see indication

that the patient had any recent stress test or whether or not he has ever had a

stress test.  At this time, cardiac enzymes are negative.  EKG is unremarkable.  He

still continues to have some chest discomfort as 4/10. 



PAST MEDICAL HISTORY:  Significant for hypertension and some type of toe surgery.



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  There is no history of

alcohol or tobacco abuse. 



FAMILY HISTORY:  Noncontributory at this time.



ALLERGIES:  NONE.



MEDICATIONS:  Prior to admission included:

1. Aspirin.

2. Some medicine for hypertension.



REVIEW OF SYSTEMS:  A 12-point review of systems was unremarkable except for what

was noted in the history of the present illness. 



PHYSICAL EXAMINATION:

GENERAL:  Reveals a well-developed, well-nourished, middle-aged gentleman, in no

acute distress. 

VITAL SIGNS:  Blood pressure is 126/82, heart rate is 60 and regular, respiratory

rate 16, he is afebrile, O2 saturation 98% on room air. 

HEENT:  Shows the head to be normocephalic and atraumatic. 

NECK:  Carotid pulses are present.  Did not hear any bruits.  There is no JVD noted. 

CHEST:  Clear to auscultation.  No rales, rhonchi, or wheezing were noted. 

CARDIOVASCULAR:  Reveals a regular rate and rhythm with normal S1 and S2.  There is

no S3 or S4.  There were no significant murmurs, heaves, thrills, bruits, or rubs

noted. 

ABDOMEN:  Soft and nontender.  Positive bowel sounds are present.  There is no

organomegaly or masses noted.  Femoral pulses are present.  Pedal pulses are

present. 

NEUROLOGIC:  The patient appears to be fully intact.  There are no gross focal motor

deficits noted. 



DIAGNOSTIC DATA:  His chest x-ray is unremarkable.  EKG is also unremarkable.



LABORATORY DATA:  His cardiac enzymes are negative.  BNP is only 20.  His potassium

was 4.3, BUN was 14, creatinine 0.73.  White blood cell count was 5.7 with a

hemoglobin of 14.3.  Also, glucose was 97.  Sodium was 137. 



IMPRESSION:  

1. Chest pain in a 64-year-old gentleman with minimal risk factors of coronary

artery disease except for hypertension.  There is no indication that the enzymes

remain negative.  EKG is also unremarkable.  From what I can determine, he has not

had a previous stress test in the past.  It may be beneficial to the patient to

undergo stress testing unless these have been performed elsewhere, but I do not see

these listed in the records from before.  We will schedule him for stress testing

for tomorrow and also echocardiogram if we cannot find an echo, but I would imagine

he did have an echocardiogram performed when he was at [facility] if he had a workup for dizziness and we will try to elicit where these

records were performed, but I will go ahead and order a stress test for the patient. 

2. Hypertension.  This is under good control at this time and he is not on any 

medications to control the blood pressure at this time.  Thus, he most likely does

not have any significant hypertension.  He has been placed on deep venous thrombosis

prophylaxis, Lovenox.  Further care of the patient will depend on the results of the

stress test and he will be followed by most likely Dr. Angel tomorrow. 





Job ID:  [id number]

## 2020-05-20 VITALS — DIASTOLIC BLOOD PRESSURE: 75 MMHG | TEMPERATURE: 97.7 F | SYSTOLIC BLOOD PRESSURE: 130 MMHG

## 2020-05-20 NOTE — NM
NUCLEAR MEDICINE

CARDIAC MYOCARDIAL PERFUSION SPECT

EJECTION FRACTION STUDY

WALL MOTION CINE:



DATE:

5/20/2020



HISTORY:

64-year-old hypertensive male presents with chest pain



TECHNIQUE:

Number of days: 1

Rest study: Technetium 99m-sestamibi (Cardiolite) dose: 9.5 mCi

Pharmacologic stress: Lexiscan dose: 0.4 mg

Stress study: Technetium 99m-sestamibi (Cardiolite) dose: 29.5 mCi



FINDINGS:



CARDIAC (MYOCARDIAL PERFUSION) SPECT

There are no reversible myocardial perfusion defects.



EJECTION FRACTION STUDY

Left ventricular EF = 65 %



WALL MOTION CINE

Normal



IMPRESSION:

No evidence of reversible ischemia.



Reported By: Sharif Guerra 

Electronically Signed:  5/20/2020 12:02 PM

## 2020-05-20 NOTE — EKG
Test Reason : CHEST PAIN

Blood Pressure : ***/*** mmHG

Vent. Rate : 059 BPM     Atrial Rate : 059 BPM

   P-R Int : 156 ms          QRS Dur : 094 ms

    QT Int : 404 ms       P-R-T Axes : 033 006 015 degrees

   QTc Int : 399 ms

 

Sinus bradycardia

Otherwise normal ECG

 

Confirmed by FERNANDO CUMMINS, LILY GRAMAJO ([id number]),  JENNIE CASTELLANOS ([id number]) on 5/20/2020 3:23:51 PM

 

Referred By:             Confirmed By:LILY KING MD

## 2020-05-21 NOTE — DIS
DATE OF ADMISSION:  05/18/2020



DATE OF DISCHARGE:  05/20/2020



PRIMARY CARE PROVIDER:  None.



DISCHARGE DIAGNOSES:  

1. Chest pain.

2. Chest pain most likely secondary to musculoskeletal etiology.



CONSULTATIONS DURING THIS HOSPITALIZATION:  Cardiology, Dr. Tirado.



CONDITION OF PATIENT ON THE DAY OF DISCHARGE:  Stable.  I assessed Mr. Dubon on

the day of discharge.  He reports the chest pain has improved.  Vital signs are

stable.  S1 and S2 are heard, regular.  Lungs are clear to auscultation bilaterally. 



HOSPITAL COURSE:  Mr. Dubon is a pleasant 64-year-old gentleman, who was

admitted to [facility] on May 18, 2020 for chest pain.  He

was seen by Cardiology Service.  Nuclear stress test on May 20th did not show any

evidence of reversible ischemia.  Left ventricular ejection fraction was 65%.

Pulmonary embolism was ruled out with negative D-dimer.  The patient continued to

improve clinically and is being discharged home in a stable condition. 



Post acute care followup:  Patient has been advised to follow up with a primary care

provider in 3 days time and with Cardiology Service in 2 to 3 weeks time. 



DIET:  Heart healthy.



ACTIVITY:  As tolerated.



DISCHARGE DESTINATION:  Home.







Job ID:  [id number]

## 2020-05-21 NOTE — PRG
DATE OF SERVICE:  05/20/2020



SUBJECTIVE:  Mr. Dubon is currently doing well.  He was admitted yesterday for

again recurrent dizziness and presyncope.  No true syncope noted.  He also

complained of atypical chest pain.  He underwent a noninvasive stress study today.

It was negative for ischemia. 



During his previous hospitalization, EVR was recommended.  This was ordered and

placed with [employer].  Authorization was pending.  Patient states he was supposed to

receive it tomorrow. 



OBJECTIVE:  VITAL SIGNS:  Blood pressure 132/81, pulse 61, and temperature 97.9. 

GENERAL:  Patient is a pleasant male, who is in no acute distress.  The patient

appears their stated age. 

NEUROLOGIC:  The patient is alert and oriented x3 with no focal neurologic deficits. 

HEENT:  Sclerae without icterus.  Mouth has moist mucous membranes with normal

pallor. 

NECK:  No JVD.  Carotid upstroke brisk.  No bruits bilaterally. 

LUNGS:  Clear to auscultation with unlabored respirations. 

BACK:  No scoliosis or kyphosis. 

CARDIAC:  Regular rate and rhythm with normal S1 and S2.  No S3 or S4 noted.  No

significant rubs, murmurs, thrills, or gallops noted throughout the precordium.  PMI

is not displaced.  There is no parasternal heave. 

ABDOMEN:  Soft, nontender, nondistended.  No peritoneal signs present.  No

hepatosplenomegaly.  No abnormal striae. 

EXTREMITIES:  2+ femoral and 2+ dorsalis pedis pulses.  No cyanosis, clubbing, or

edema. 

SKIN:  No gross abnormalities.



IMPRESSION:  

1. Atypical chest pain.

2. Dizziness and lightheadedness.

3. Presyncope.



RECOMMENDATIONS:  Patient has been on telemetry monitoring at [facility] for three

total days over the last two hospitalizations.  No significant dysrhythmias are

noted.  Due to his financial constraints, there has been difficulty with event

recorder.  Patient states he will receive it tomorrow.  This was confirmed with the

company. 



Otherwise, I have no further recommendations.







Job ID:  [id number]

## 2022-11-07 NOTE — CON
DATE OF CONSULTATION:  05/11/2020



REASON FOR CONSULTATION:  Near-syncope.



HISTORY OF PRESENT ILLNESS:  Mr. Dubon is a 64-year-old gentleman with no

significant past medical history, who states he has had dizziness, lightheadedness,

and presyncope over the last year.  He has 2 to 3 episodes per month.  He states

sometimes he gets from a lying to standing position, where he develop symptoms, but

sometimes it can be spontaneous.  He was seen and evaluated at [facility] recently with a full cardiac workup per patient.  Records from

[facility] are currently not available. 



No chest pain, pressure, or other associated symptoms.



PAST MEDICAL HISTORY:  As above.



SURGICAL HISTORY:  Foot surgery.



SOCIAL HISTORY:  No current tobacco or alcohol use.



ALLERGIES:  NONE.



MEDICATIONS:  None.



REVIEW OF SYSTEMS:  A 10-point review of systems is reviewed as above, otherwise

negative. 



PHYSICAL EXAMINATION:

GENERAL:  Patient is a pleasant male, who is in no acute distress.  The patient

appears their stated age. 

VITAL SIGNS:  Blood pressure lying, sitting, standing 111/62, 105/64, and 108/71

respectively.  Pulse 63. 

NEUROLOGIC:  The patient is alert and oriented x3 with no focal neurologic deficits. 

HEENT:  Sclerae without icterus.  Mouth has moist mucous membranes with normal

pallor. 

NECK:  No JVD.  Carotid upstroke brisk.  No bruits bilaterally. 

LUNGS:  Clear to auscultation with unlabored respirations. 

BACK:  No scoliosis or kyphosis. 

CARDIAC:  Regular rate and rhythm with normal S1 and S2.  No S3 or S4 noted.  No

significant rubs, murmurs, thrills, or gallops noted throughout the precordium.  PMI

is not displaced.  There is no parasternal heave. 

ABDOMEN:  Soft, nontender, nondistended.  No peritoneal signs present.  No

hepatosplenomegaly.  No abnormal striae. 

EXTREMITIES:  2+ femoral and 2+ dorsalis pedis pulses.  No cyanosis, clubbing, or

edema. 

SKIN:  No gross abnormalities.



DIAGNOSTIC DATA:  EKG; normal sinus rhythm, normal EKG.



PERTINENT LABORATORY DATA:  Hemoglobin 14.2.  Creatinine 0.69.  CK troponin negative.



IMPRESSION:  Presyncope.



RECOMMENDATIONS:  

1. Obtain records from [facility].  Do not repeat any further

studies. 

2. The patient does have bradycardia noted on the monitor, but all above 50.

3. If echo and stress have been performed at [facility], we will

therefore recommend a 3-week event recorder. 

4. Otherwise, I have no further recommendations.







Job ID:  [id number]
See above